# Patient Record
Sex: FEMALE | Race: BLACK OR AFRICAN AMERICAN | NOT HISPANIC OR LATINO | Employment: PART TIME | URBAN - METROPOLITAN AREA
[De-identification: names, ages, dates, MRNs, and addresses within clinical notes are randomized per-mention and may not be internally consistent; named-entity substitution may affect disease eponyms.]

---

## 2019-06-12 ENCOUNTER — OFFICE VISIT (OUTPATIENT)
Dept: URGENT CARE | Facility: CLINIC | Age: 19
End: 2019-06-12
Payer: COMMERCIAL

## 2019-06-12 VITALS
OXYGEN SATURATION: 97 % | SYSTOLIC BLOOD PRESSURE: 107 MMHG | TEMPERATURE: 98 F | DIASTOLIC BLOOD PRESSURE: 62 MMHG | HEART RATE: 73 BPM | BODY MASS INDEX: 23.8 KG/M2 | HEIGHT: 71 IN | RESPIRATION RATE: 19 BRPM | WEIGHT: 170 LBS

## 2019-06-12 DIAGNOSIS — T14.8XXA MUSCLE STRAIN: Primary | ICD-10-CM

## 2019-06-12 PROCEDURE — 3008F PR BODY MASS INDEX (BMI) DOCUMENTED: ICD-10-PCS | Mod: CPTII,S$GLB,, | Performed by: FAMILY MEDICINE

## 2019-06-12 PROCEDURE — 3008F BODY MASS INDEX DOCD: CPT | Mod: CPTII,S$GLB,, | Performed by: FAMILY MEDICINE

## 2019-06-12 PROCEDURE — 99203 OFFICE O/P NEW LOW 30 MIN: CPT | Mod: S$GLB,,, | Performed by: FAMILY MEDICINE

## 2019-06-12 PROCEDURE — 99203 PR OFFICE/OUTPT VISIT, NEW, LEVL III, 30-44 MIN: ICD-10-PCS | Mod: S$GLB,,, | Performed by: FAMILY MEDICINE

## 2019-06-12 RX ORDER — ALBUTEROL SULFATE 0.63 MG/3ML
0.63 SOLUTION RESPIRATORY (INHALATION)
COMMUNITY

## 2019-06-12 RX ORDER — NORGESTIMATE AND ETHINYL ESTRADIOL 7DAYSX3 LO
1 KIT ORAL DAILY
COMMUNITY

## 2019-06-12 NOTE — PATIENT INSTRUCTIONS
Neck Sprain or Strain  A sudden force that causes turning or bending of the neck can cause sprain or strain. An example would be the force from a car accident. This can stretch or tear muscles called a strain. It can also stretch or tear ligaments called a sprain. Either of these can cause neck pain. Sometimes neck pain occurs after a simple awkward movement. In either case, muscle spasm is commonly present and contributes to the pain.     Unless you had a forceful physical injury (for example, a car accident or fall), X-rays are usually not ordered for the initial evaluation of neck pain. If pain continues and dose not respond to medical treatment, X-rays and other tests may be performed at a later time.  Home care  · You may feel more soreness and spasm the first few days after the injury. Rest until symptoms begin to improve.  · When lying down, use a comfortable pillow or a rolled towel that supports the head and keeps the spine in a neutral position. The position of the head should not be tilted forward or backward.  · Apply an ice pack over the injured area for 15 to 20 minutes every 3 to 6 hours. You should do this for the first 24 to 48 hours. You can make an ice pack by filling a plastic bag that seals at the top with ice cubes and then wrapping it with a thin towel. After 48 hours, apply heat (warm shower or warm bath) for 15 to 20 minutes several times a day, or alternate ice and heat.  · You may use over-the-counter pain medicine to control pain, unless another pain medicine was prescribed. If you have chronic liver or kidney disease or ever had a stomach ulcer or GI bleeding, talk with your healthcare provider before using these medicines.  · If a soft cervical collar was prescribed, it should be worn only for periods of increased pain. It should not be worn for more than 3 hours a day, or for a period longer than 1 to 2 weeks.  Follow-up care  Follow up with your healthcare provider as directed.  Physical therapy may be needed.  Sometimes fractures dont show up on the first X-ray. Bruises and sprains can sometimes hurt as much as a fracture. These injuries can take time to heal completely. If your symptoms dont improve or they get worse, talk with your healthcare provider. You may need a repeat X-ray or other tests. If X-rays were taken, you will be told of any new findings that may affect your care.  Call 911  Call 911 if you have:  · Neck swelling, difficulty or painful swallowing  · Difficulty breathing  · Chest pain  When to seek medical advice  Call your healthcare provider right away if any of these occur:  · Pain becomes worse or spreads into your arms  · Weakness or numbness in one or both arms  Date Last Reviewed: 11/19/2015  © 2928-8736 [employer]. [address] [place] [address], [place] [address]. All rights reserved. This information is not intended as a substitute for professional medical care. Always follow your healthcare professional's instructions.      TRY IBUPROFEN 600 MG EVERY 6 HR AS NEEDED.    TRY FREQUENT GENTLE STRETCHES.    Make sure that you follow up with your primary care doctor in the next 2-5 days if needed .  Return to the Urgent Care if signs or symptoms change and certainly if you have worsening symptoms go to the nearest emergency department for further evaluation.

## 2019-06-12 NOTE — PROGRESS NOTES
"Subjective:       Patient ID: Madison Miller is a 18 y.o. female.    Vitals:  height is 5' 10.75" (1.797 m) and weight is 77.1 kg (170 lb). Her oral temperature is 98 °F (36.7 °C). Her blood pressure is 107/62 and her pulse is 73. Her respiration is 19 and oxygen saturation is 97%.     Chief Complaint: Shoulder Pain and Numbness    Patient presents to clinic today with left thumb numbness, left forearm bruising, and right neck pain. Patient reports being taken to the ER 4 days ago by ambulance after episode of intoxication.  She was intoxicated and had an IV placed in her left forearm.  She denies any pain or swelling of the left forearm but states some bruising has developed.  She states that her left thumb has decreased sensation.  She denies any paresthesias, pain, or edema. Her right shoulder pain began this morning shortly after waking up.  She lives in a dormitory and states that she sleeps on 1 pillow.  She denies any trauma or injury.    Shoulder Pain    The pain is present in the right shoulder. This is a new problem. The current episode started today. There has been no history of extremity trauma. The problem occurs constantly. The problem has been gradually worsening. The quality of the pain is described as aching. The pain is at a severity of 7/10. The pain is moderate. Associated symptoms include numbness. Pertinent negatives include no fever or headaches. She has tried nothing for the symptoms.       Constitution: Negative for chills, fatigue and fever.   HENT: Negative for congestion and sore throat.    Neck: Negative for painful lymph nodes.   Cardiovascular: Negative for chest pain and leg swelling.   Eyes: Negative for double vision and blurred vision.   Respiratory: Negative for cough and shortness of breath.    Gastrointestinal: Negative for nausea, vomiting and diarrhea.   Genitourinary: Negative for dysuria, frequency, urgency and history of kidney stones.   Musculoskeletal: Negative for joint " pain, joint swelling, muscle cramps and muscle ache.   Skin: Negative for color change, pale, rash and bruising.   Allergic/Immunologic: Negative for seasonal allergies.   Neurological: Positive for numbness and tingling. Negative for dizziness, history of vertigo, light-headedness, passing out and headaches.   Hematologic/Lymphatic: Negative for swollen lymph nodes.   Psychiatric/Behavioral: Negative for nervous/anxious, sleep disturbance and depression. The patient is not nervous/anxious.        Objective:      Physical Exam   Constitutional: She appears well-developed and well-nourished. No distress.   HENT:   Head: Normocephalic and atraumatic.   Eyes: Pupils are equal, round, and reactive to light. Conjunctivae and EOM are normal.   Neck: Normal range of motion. Neck supple.   Cardiovascular: Normal rate, regular rhythm, normal heart sounds and intact distal pulses. Exam reveals no gallop and no friction rub.   No murmur heard.  Pulmonary/Chest: Effort normal and breath sounds normal. No stridor. No respiratory distress. She has no wheezes. She has no rales. She exhibits no tenderness.   Musculoskeletal: Normal range of motion. She exhibits tenderness (Tenderness to palpation of right rhomboid and trapezius muscles medial to scapula. ). She exhibits no edema or deformity.   Left thumb has no edema or deformity. Circulation  Intact radial pulse 2+. Negative Tinels sign on left wrist. Strength and ROM intact. Opposition intact. Sensation intact.    TTP of right rhomboid and trapezius muscles. Mild spasm palpated. ROM intact with shoulder adduction and abduction. No bony deformity or edema present. No ecchymosis or effusion present. Neck flexion and extension are intact.   Lymphadenopathy:     She has no cervical adenopathy.   Skin: She is not diaphoretic.       Assessment:       1. Muscle strain        Plan:         Muscle strain    TRY IBUPROFEN 600 MG EVERY 6 HR AS NEEDED.    TRY FREQUENT GENTLE  STRETCHES.    Make sure that you follow up with your primary care doctor in the next 2-5 days if needed .  Return to the Urgent Care if signs or symptoms change and certainly if you have worsening symptoms go to the nearest emergency department for further evaluation.

## 2021-12-17 ENCOUNTER — IMMUNIZATION (OUTPATIENT)
Dept: PRIMARY CARE CLINIC | Facility: CLINIC | Age: 21
End: 2021-12-17
Payer: OTHER GOVERNMENT

## 2021-12-17 DIAGNOSIS — Z23 NEED FOR VACCINATION: Primary | ICD-10-CM

## 2021-12-17 PROCEDURE — 0004A COVID-19, MRNA, LNP-S, PF, 30 MCG/0.3 ML DOSE VACCINE: CPT | Mod: PBBFAC | Performed by: EMERGENCY MEDICINE
